# Patient Record
(demographics unavailable — no encounter records)

---

## 2025-03-17 NOTE — PHYSICAL EXAM
[Abdominal Masses] : No abdominal masses [Abdomen Tenderness] : ~T ~M No abdominal tenderness [Alert] : alert [Oriented to Person] : oriented to person [Oriented to Place] : oriented to place [Oriented to Time] : oriented to time [Calm] : calm [de-identified] : She  is alert, well-groomed, and in NAD   [de-identified] : anicteric.  Nasal mucosa pink, septum midline. Oral mucosa pink.  Tongue midline, Pharynx without exudates.   [de-identified] : Neck supple. Trachea midline. Thyroid isthmus barely palpable, lobes not felt.   [de-identified] :  scar

## 2025-03-17 NOTE — CONSULT LETTER
[Dear  ___] : Dear  [unfilled], [Consult Letter:] : I had the pleasure of evaluating your patient, [unfilled]. [Please see my note below.] : Please see my note below. [Consult Closing:] : Thank you very much for allowing me to participate in the care of this patient.  If you have any questions, please do not hesitate to contact me. [Sincerely,] : Sincerely, [FreeTextEntry3] : Vic Mosquera MD, FACS

## 2025-03-17 NOTE — DATA REVIEWED
[FreeTextEntry1] : 	 Exam requested by: LARRY PAREKH MD [id number] [facility] [address] SITE PERFORMED: [facility] SITE PHONE: [phone] Patient: ZAKI WEBSTER YOB: 1981 Phone: [phone] MRN: [id number] Acc: [id number] Date of Exam: 02-   EXAM:  ULTRASOUND ABDOMEN COMPLETE  HISTORY:  Female, 43 years-old with abdominal pain  TECHNIQUE:  Using real-time ultrasonography with a high-resolution broadband phased-array curved transducer, multiplanar gray scale images were obtained and supplemented with color Doppler. Static images are provided for review.  COMPARISON:  None  FINDINGS:   Abdominal Aorta/IVC: No evidence of abdominal aortic aneurysm. Visualized portions of the IVC were patent.  Liver:  Normal in size and contours. The liver has normal echogenicity. No liver lesion demonstrated. The visualized portions of portal and hepatic veins are unremarkable. No intrahepatic biliary duct dilatation.  Gallbladder: There is an anterior gallbladder wall polyp measuring 0.6 cm and 2 posterior gallbladder wall polyps at the gallbladder body and gallbladder fundus measuring up to 0.6 cm each. No gallstones, no wall thickening, and no sonographic Hendricks's Sign.   Common Duct: Normal diameter at the escobar hepatis, measuring 0.3 cm.  Pancreas: The visualized portion of the body of the pancreas is within normal limits. The tail is obscured by overlying bowel gas.  Kidneys: Normal in size. Renal cortical thickness and echogenicity are normal. There is no suspicious renal lesion. Right Kidney: 10.0 cm in length. No hydronephrosis. No renal calculus. No renal cyst. Left Kidney:   8.8 cm in length. No hydronephrosis. No renal calculus. No renal cyst.  Spleen: Normal size, contour and echotexture measuring 3.0 cm in length.  IMPRESSION: Gallbladder polyps. Otherwise unremarkable study.   Thank you for the opportunity to participate in the care of this patient.     ISABEL SANTIAGO MD  - Electronically Signed: 02- 12:30 PM  Physician to Physician Direct Line is: [phone]

## 2025-03-17 NOTE — HISTORY OF PRESENT ILLNESS
[de-identified] : Ms. ZAKI KIMBLE is a 43 year  old patient who was referred by Dr. Ashley Osei  with the chief complaint of having abnormal findings on her US of the abdomen. She reports gastritis like symptoms  on and off  for 1 year. Pain is non-radiating.    She reports no nausea or vomiting and no history of jaundice, acholia or acholuria.   Appetite is good and weight is stable.   She   has no family history of biliary tract disease.    she reports EGD that showed mild gastritis.  She had an abdominal sonogram on  02/21/2025 which revealed multiple GB polyps . largest measuring 6 mm . CBD is normal

## 2025-03-17 NOTE — PLAN
[FreeTextEntry1] : Ms. MARGE KIMBLE  was told significance of findings, options, risks and benefits were explained.  Informed consent for laparoscopic/possible open  cholecystectomy  and potential risks, benefits and alternatives (surgical options were discussed including non-surgical options or the option of no surgery) to the planned surgery were discussed in depth.  All surgical options were discussed including non-surgical treatments.    she was advised to repeat US in August  and return after the test.  Patient instructed to maintain a fat-free diet, and to seek immediate medical attention with any acute change or worsening of symptoms, including but not limited to abdominal pain, fever, chills, nausea, vomiting, or yellowing of the skin.  Patient advised to seek immediate medical attention with any acute change in symptoms or with the development of any new or worsening symptoms.  Patient's questions and concerns addressed to patient's satisfaction, and patient verbalized an understanding of the information discussed.